# Patient Record
Sex: MALE | URBAN - METROPOLITAN AREA
[De-identification: names, ages, dates, MRNs, and addresses within clinical notes are randomized per-mention and may not be internally consistent; named-entity substitution may affect disease eponyms.]

---

## 2018-03-31 ENCOUNTER — OFFICE VISIT (OUTPATIENT)
Dept: URGENT CARE | Facility: URGENT CARE | Age: 28
End: 2018-03-31
Payer: COMMERCIAL

## 2018-03-31 VITALS
DIASTOLIC BLOOD PRESSURE: 74 MMHG | HEART RATE: 67 BPM | TEMPERATURE: 97.7 F | WEIGHT: 195 LBS | OXYGEN SATURATION: 94 % | SYSTOLIC BLOOD PRESSURE: 113 MMHG

## 2018-03-31 DIAGNOSIS — R07.0 THROAT PAIN: ICD-10-CM

## 2018-03-31 DIAGNOSIS — J02.0 ACUTE STREPTOCOCCAL PHARYNGITIS: Primary | ICD-10-CM

## 2018-03-31 LAB
DEPRECATED S PYO AG THROAT QL EIA: ABNORMAL
SPECIMEN SOURCE: ABNORMAL

## 2018-03-31 PROCEDURE — 99203 OFFICE O/P NEW LOW 30 MIN: CPT | Performed by: PHYSICIAN ASSISTANT

## 2018-03-31 PROCEDURE — 87880 STREP A ASSAY W/OPTIC: CPT | Performed by: PHYSICIAN ASSISTANT

## 2018-03-31 RX ORDER — AMOXICILLIN 500 MG/1
500 CAPSULE ORAL 2 TIMES DAILY
Qty: 20 CAPSULE | Refills: 0 | Status: SHIPPED | OUTPATIENT
Start: 2018-03-31 | End: 2018-04-10

## 2018-03-31 ASSESSMENT — ENCOUNTER SYMPTOMS
SINUS PAIN: 0
VOMITING: 0
COUGH: 1
HEADACHES: 0
DIARRHEA: 0
MYALGIAS: 1
ABDOMINAL PAIN: 0
SORE THROAT: 1
NAUSEA: 0
FOCAL WEAKNESS: 0
SHORTNESS OF BREATH: 0
CHILLS: 1
FEVER: 1

## 2018-03-31 NOTE — NURSING NOTE
Chief Complaint   Patient presents with     Urgent Care     Pharyngitis     x 4 days sore throat, fever and cough       Initial /74 (BP Location: Left arm, Patient Position: Chair, Cuff Size: Adult Large)  Pulse 67  Temp 97.7  F (36.5  C) (Tympanic)  Wt 195 lb (88.5 kg)  SpO2 94% There is no height or weight on file to calculate BMI.  Medication Reconciliation: complete     Noreen Ramos MA

## 2018-03-31 NOTE — PROGRESS NOTES
HPI  March 31, 2018    HPI: Kirill Penn is a 27 year old male who complains of moderate sore throat & cough onset 3 days ago. He also has had chills, fever, and myalgias as well as a hoarse voice the past day. Unknown Tmax. Symptoms are constant in duration. No treatments tried. Denies HA, CP, SOB, abd pain, N/V/D, rash, or any other symptoms. Patient denies sick contacts.    History reviewed. No pertinent past medical history.  Past Surgical History:   Procedure Laterality Date     TONSILLECTOMY       Social History   Substance Use Topics     Smoking status: Never Smoker     Smokeless tobacco: Never Used     Alcohol use No     There is no problem list on file for this patient.    Family History   Problem Relation Age of Onset     Family history unknown: Yes        Problem list, Medication list, Allergies, and Medical/Social/Surgical histories reviewed in [facility] and updated as appropriate.      Review of Systems   Constitutional: Positive for chills, fever and malaise/fatigue.   HENT: Positive for sore throat. Negative for congestion and sinus pain.    Respiratory: Positive for cough. Negative for shortness of breath.    Cardiovascular: Negative for chest pain.   Gastrointestinal: Negative for abdominal pain, diarrhea, nausea and vomiting.   Musculoskeletal: Positive for myalgias.   Skin: Negative for rash.   Neurological: Negative for focal weakness and headaches.   All other systems reviewed and are negative.        Physical Exam   Constitutional: He is oriented to person, place, and time and well-developed, well-nourished, and in no distress.   Hoarse voice   HENT:   Head: Normocephalic and atraumatic.   Right Ear: Tympanic membrane, external ear and ear canal normal.   Left Ear: Tympanic membrane, external ear and ear canal normal.   Nose: Nose normal.   Mouth/Throat: Uvula is midline and mucous membranes are normal. Posterior oropharyngeal erythema present.   S/p tonsillectomy   Cardiovascular: Normal rate,  regular rhythm and normal heart sounds.    Pulmonary/Chest: Effort normal and breath sounds normal.   Musculoskeletal: Normal range of motion.   Neurological: He is alert and oriented to person, place, and time. Gait normal.   Skin: Skin is warm and dry.   Nursing note and vitals reviewed.      Vital Signs  /74 (BP Location: Left arm, Patient Position: Chair, Cuff Size: Adult Large)  Pulse 67  Temp 97.7  F (36.5  C) (Tympanic)  Wt 195 lb (88.5 kg)  SpO2 94%     Diagnostic Test Results:  Results for orders placed or performed in visit on 03/31/18 (from the past 24 hour(s))   Strep, Rapid Screen   Result Value Ref Range    Specimen Description Throat     Rapid Strep A Screen (A)      POSITIVE: Group A Streptococcal antigen detected by immunoassay.       ASSESSMENT/PLAN      ICD-10-CM    1. Acute streptococcal pharyngitis J02.0 amoxicillin (AMOXIL) 500 MG capsule   2. Throat pain R07.0 Strep, Rapid Screen      Lungs CTAB, nontoxic appearance. Strep positive, Rx amoxicillin.      I have discussed any lab or imaging results, the patient's diagnosis, and my plan of treatment with the patient and/or family. Patient is aware to come back in if with worsening symptoms or if no relief despite treatment plan.  Patient voiced understanding and had no further questions.       Follow Up: Return if symptoms worsen or fail to improve.    SUPRIYA Stearns, PA-C  [facility] URG[facility]

## 2018-03-31 NOTE — MR AVS SNAPSHOT
"              After Visit Summary   3/31/2018    Kirill Penn    MRN: [id number]           Patient Information     Date Of Birth          1990        Visit Information        Provider Department      3/31/2018 8:35 AM Jenn Clark PA-C [facility] Urgent [facility]        Today's Diagnoses     Acute streptococcal pharyngitis    -  1    Throat pain           Follow-ups after your visit        Follow-up notes from your care team     Return if symptoms worsen or fail to improve.      Who to contact     If you have questions or need follow up information about today's clinic visit or your schedule please contact [facility] URGENT [facility] directly at [phone].  Normal or non-critical lab and imaging results will be communicated to you by MyChart, letter or phone within 4 business days after the clinic has received the results. If you do not hear from us within 7 days, please contact the clinic through MyChart or phone. If you have a critical or abnormal lab result, we will notify you by phone as soon as possible.  Submit refill requests through [employer] or call your pharmacy and they will forward the refill request to us. Please allow 3 business days for your refill to be completed.          Additional Information About Your Visit        MyChart Information     [employer] lets you send messages to your doctor, view your test results, renew your prescriptions, schedule appointments and more. To sign up, go to [email or web address] . Click on \"Log in\" on the left side of the screen, which will take you to the Welcome page. Then click on \"Sign up Now\" on the right side of the page.     You will be asked to enter the access code listed below, as well as some personal information. Please follow the directions to create your username and password.     Your access code is: [email or web address]  Expires: 2018  9:01 AM     Your access code will  in 90 days. If you need help or a new code, " please call your [place] clinic or [phone].        Care EveryWhere ID     This is your Care EveryWhere ID. This could be used by other organizations to access your [place] medical records  [id number]        Your Vitals Were     Pulse Temperature Pulse Oximetry             67 97.7  F (36.5  C) (Tympanic) 94%          Blood Pressure from Last 3 Encounters:   03/31/18 113/74    Weight from Last 3 Encounters:   03/31/18 195 lb (88.5 kg)              We Performed the Following     Strep, Rapid Screen          Today's Medication Changes          These changes are accurate as of 3/31/18  9:01 AM.  If you have any questions, ask your nurse or doctor.               Start taking these medicines.        Dose/Directions    amoxicillin 500 MG capsule   Commonly known as:  AMOXIL   Used for:  Acute streptococcal pharyngitis   Started by:  Jenn Clark PA-C        Dose:  500 mg   Take 1 capsule (500 mg) by mouth 2 times daily for 10 days   Quantity:  20 capsule   Refills:  0            Where to get your medicines      These medications were sent to [facility]/pharmacy #[id number] - [place], [place] - [address]  [address] [address]     Phone:  [phone]     amoxicillin 500 MG capsule                Primary Care Provider Fax #    Provider Not In System [phone]                Equal Access to Services     MILEY BUNCH AH: Hadii sterling camp hadasho Sotwanali, waaxda luqadaha, qaybta kaalmada adeegyada, waxay tosin butt. So [facility] [phone].    ATENCIÓN: Si habla español, tiene a marin disposición servicios gratuitos de asistencia lingüística. Llame al [phone].    We comply with applicable federal civil rights laws and [place] laws. We do not discriminate on the basis of race, color, national origin, age, disability, sex, sexual orientation, or gender identity.            Thank you!     Thank you for choosing [facility] URGENT [facility]ARE  for your care. Our goal is always to  provide you with excellent care. Hearing back from our patients is one way we can continue to improve our services. Please take a few minutes to complete the written survey that you may receive in the mail after your visit with us. Thank you!             Your Updated Medication List - Protect others around you: Learn how to safely use, store and throw away your medicines at www.disposemymeds.org.          This list is accurate as of 3/31/18  9:01 AM.  Always use your most recent med list.                   Brand Name Dispense Instructions for use Diagnosis    amoxicillin 500 MG capsule    AMOXIL    20 capsule    Take 1 capsule (500 mg) by mouth 2 times daily for 10 days    Acute streptococcal pharyngitis

## 2018-04-02 ENCOUNTER — TELEPHONE (OUTPATIENT)
Dept: URGENT CARE | Facility: URGENT CARE | Age: 28
End: 2018-04-02

## 2018-04-02 DIAGNOSIS — J02.0 STREPTOCOCCAL SORE THROAT: Primary | ICD-10-CM

## 2018-04-02 RX ORDER — CEFPROZIL 500 MG/1
500 TABLET, FILM COATED ORAL 2 TIMES DAILY
Qty: 20 TABLET | Refills: 0 | Status: SHIPPED | OUTPATIENT
Start: 2018-04-02 | End: 2019-10-24

## 2018-04-02 NOTE — TELEPHONE ENCOUNTER
Permission by pt to talk to girlfriend Monica as he is having difficulty talking due to pain. States throat is becoming more painful and difficult to swallow due to pain. Please advise.Routed to UC providers.  Ferdinand WAGNER

## 2018-04-02 NOTE — TELEPHONE ENCOUNTER
Pt's girlfriend called the clinic  Pt has been taking his antibiotics for 2 days for his strep  Pt is not getting better, states pt is getting worse  Pt is requesting a new antibiotic  Please call [phone] for detailed message

## 2018-04-02 NOTE — TELEPHONE ENCOUNTER
Try course of cefzil 2x daily for 10 days  Stop amoxicillin  If symptoms fail to improve on this then advise having recheck in the urgent care.    Thank you  MARGY HaynesC

## 2019-10-24 ENCOUNTER — OFFICE VISIT (OUTPATIENT)
Dept: FAMILY MEDICINE | Facility: CLINIC | Age: 29
End: 2019-10-24
Payer: COMMERCIAL

## 2019-10-24 VITALS
OXYGEN SATURATION: 98 % | SYSTOLIC BLOOD PRESSURE: 126 MMHG | DIASTOLIC BLOOD PRESSURE: 86 MMHG | TEMPERATURE: 97.8 F | HEART RATE: 60 BPM | WEIGHT: 168 LBS | BODY MASS INDEX: 22.75 KG/M2 | HEIGHT: 72 IN

## 2019-10-24 DIAGNOSIS — R68.82 LOW LIBIDO: ICD-10-CM

## 2019-10-24 DIAGNOSIS — B36.0 TINEA VERSICOLOR: ICD-10-CM

## 2019-10-24 DIAGNOSIS — Z11.4 ENCOUNTER FOR SCREENING FOR HIV: ICD-10-CM

## 2019-10-24 DIAGNOSIS — Z00.00 ROUTINE GENERAL MEDICAL EXAMINATION AT A HEALTH CARE FACILITY: Primary | ICD-10-CM

## 2019-10-24 PROBLEM — S52.101A: Status: ACTIVE | Noted: 2019-06-15

## 2019-10-24 LAB
CHOLEST SERPL-MCNC: 236 MG/DL
GLUCOSE SERPL-MCNC: 97 MG/DL (ref 70–99)
HDLC SERPL-MCNC: 61 MG/DL
LDLC SERPL CALC-MCNC: 164 MG/DL
NONHDLC SERPL-MCNC: 175 MG/DL
TRIGL SERPL-MCNC: 54 MG/DL

## 2019-10-24 PROCEDURE — 84403 ASSAY OF TOTAL TESTOSTERONE: CPT | Performed by: FAMILY MEDICINE

## 2019-10-24 PROCEDURE — 80061 LIPID PANEL: CPT | Performed by: FAMILY MEDICINE

## 2019-10-24 PROCEDURE — 82947 ASSAY GLUCOSE BLOOD QUANT: CPT | Performed by: FAMILY MEDICINE

## 2019-10-24 PROCEDURE — 36415 COLL VENOUS BLD VENIPUNCTURE: CPT | Performed by: FAMILY MEDICINE

## 2019-10-24 PROCEDURE — 99385 PREV VISIT NEW AGE 18-39: CPT | Performed by: FAMILY MEDICINE

## 2019-10-24 PROCEDURE — 87389 HIV-1 AG W/HIV-1&-2 AB AG IA: CPT | Performed by: FAMILY MEDICINE

## 2019-10-24 RX ORDER — FLUCONAZOLE 200 MG/1
400 TABLET ORAL ONCE
Qty: 2 TABLET | Refills: 0 | Status: SHIPPED | OUTPATIENT
Start: 2019-10-24 | End: 2019-10-24

## 2019-10-24 ASSESSMENT — PAIN SCALES - GENERAL: PAINLEVEL: NO PAIN (0)

## 2019-10-24 ASSESSMENT — MIFFLIN-ST. JEOR: SCORE: 1755.04

## 2019-10-24 NOTE — LETTER
[facility]   [address] [place]  [address]  [phone]                                   October 28, 2019    Kirill Fili  [address] [address]        Dear Kirill,    Test results for your blood sugar to screen for diabetes and HIV were normal.  Although your cholesterol levels are elevated at this time, your risk for heart disease remains low due to your age.  Medication would not be recommended for these values.  Working on a diet that is low in simple carbohydrates and sugar would be helpful to bring down your cholesterol over the long-term.  It was nice to see you again, and let me know if you have any questions about these test results.    Results for orders placed or performed in visit on 10/24/19   HIV Screening   Result Value Ref Range    HIV Antigen Antibody Combo Nonreactive NR^Nonreactive       Lipid panel reflex to direct LDL Fasting   Result Value Ref Range    Cholesterol 236 (H) <200 mg/dL    Triglycerides 54 <150 mg/dL    HDL Cholesterol 61 >39 mg/dL    LDL Cholesterol Calculated 164 (H) <100 mg/dL    Non HDL Cholesterol 175 (H) <130 mg/dL   Glucose   Result Value Ref Range    Glucose 97 70 - 99 mg/dL       If you have any questions please call the clinic at [phone]    Sincerely,    Kirill Gauthier MD  bmd

## 2019-10-24 NOTE — LETTER
[facility]   [address] [place]  [address]  [phone]                                   October 29, 2019    Kirill Penn  [address] [address]        Dear Kirill,    Testosterone levels look good.  Let me know if you have any questions about this test result.    Results for orders placed or performed in visit on 10/24/19   HIV Screening   Result Value Ref Range    HIV Antigen Antibody Combo Nonreactive NR^Nonreactive       Lipid panel reflex to direct LDL Fasting   Result Value Ref Range    Cholesterol 236 (H) <200 mg/dL    Triglycerides 54 <150 mg/dL    HDL Cholesterol 61 >39 mg/dL    LDL Cholesterol Calculated 164 (H) <100 mg/dL    Non HDL Cholesterol 175 (H) <130 mg/dL   Glucose   Result Value Ref Range    Glucose 97 70 - 99 mg/dL   Testosterone, total   Result Value Ref Range    Testosterone Total 823 240 - 950 ng/dL       If you have any questions please call the clinic at [phone]    Sincerely,    Kirill Gauthier MD  bmd
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
## 2019-10-25 LAB — HIV 1+2 AB+HIV1 P24 AG SERPL QL IA: NONREACTIVE

## 2019-10-29 LAB — TESTOST SERPL-MCNC: 823 NG/DL (ref 240–950)

## 2020-03-10 ENCOUNTER — HEALTH MAINTENANCE LETTER (OUTPATIENT)
Age: 30
End: 2020-03-10

## 2020-12-27 ENCOUNTER — HEALTH MAINTENANCE LETTER (OUTPATIENT)
Age: 30
End: 2020-12-27

## 2021-10-09 ENCOUNTER — HEALTH MAINTENANCE LETTER (OUTPATIENT)
Age: 31
End: 2021-10-09

## 2022-01-29 ENCOUNTER — HEALTH MAINTENANCE LETTER (OUTPATIENT)
Age: 32
End: 2022-01-29

## 2022-09-11 ENCOUNTER — HEALTH MAINTENANCE LETTER (OUTPATIENT)
Age: 32
End: 2022-09-11

## 2023-05-06 ENCOUNTER — HEALTH MAINTENANCE LETTER (OUTPATIENT)
Age: 33
End: 2023-05-06